# Patient Record
Sex: MALE
[De-identification: names, ages, dates, MRNs, and addresses within clinical notes are randomized per-mention and may not be internally consistent; named-entity substitution may affect disease eponyms.]

---

## 2017-04-05 ENCOUNTER — MEDICATION RENEWAL (OUTPATIENT)
Age: 62
End: 2017-04-05

## 2017-05-23 ENCOUNTER — APPOINTMENT (OUTPATIENT)
Dept: ENDOCRINOLOGY | Facility: CLINIC | Age: 62
End: 2017-05-23

## 2017-05-23 VITALS
BODY MASS INDEX: 33.91 KG/M2 | DIASTOLIC BLOOD PRESSURE: 63 MMHG | SYSTOLIC BLOOD PRESSURE: 99 MMHG | WEIGHT: 250 LBS | HEART RATE: 62 BPM

## 2017-06-08 ENCOUNTER — MEDICATION RENEWAL (OUTPATIENT)
Age: 62
End: 2017-06-08

## 2017-10-31 ENCOUNTER — RX RENEWAL (OUTPATIENT)
Age: 62
End: 2017-10-31

## 2017-12-18 ENCOUNTER — RX RENEWAL (OUTPATIENT)
Age: 62
End: 2017-12-18

## 2018-05-07 ENCOUNTER — APPOINTMENT (OUTPATIENT)
Dept: ENDOCRINOLOGY | Facility: CLINIC | Age: 63
End: 2018-05-07
Payer: COMMERCIAL

## 2018-05-07 VITALS
HEIGHT: 72 IN | HEART RATE: 68 BPM | SYSTOLIC BLOOD PRESSURE: 135 MMHG | WEIGHT: 237 LBS | DIASTOLIC BLOOD PRESSURE: 71 MMHG | BODY MASS INDEX: 32.1 KG/M2

## 2018-05-07 PROCEDURE — 99214 OFFICE O/P EST MOD 30 MIN: CPT

## 2018-11-01 ENCOUNTER — MEDICATION RENEWAL (OUTPATIENT)
Age: 63
End: 2018-11-01

## 2018-11-05 ENCOUNTER — MEDICATION RENEWAL (OUTPATIENT)
Age: 63
End: 2018-11-05

## 2019-01-02 ENCOUNTER — MEDICATION RENEWAL (OUTPATIENT)
Age: 64
End: 2019-01-02

## 2019-07-23 ENCOUNTER — APPOINTMENT (OUTPATIENT)
Dept: ENDOCRINOLOGY | Facility: CLINIC | Age: 64
End: 2019-07-23
Payer: COMMERCIAL

## 2019-07-23 VITALS
HEIGHT: 72 IN | DIASTOLIC BLOOD PRESSURE: 66 MMHG | WEIGHT: 229 LBS | SYSTOLIC BLOOD PRESSURE: 118 MMHG | BODY MASS INDEX: 31.02 KG/M2 | HEART RATE: 78 BPM

## 2019-07-23 LAB — HBA1C MFR BLD HPLC: 7.8

## 2019-07-23 PROCEDURE — 83036 HEMOGLOBIN GLYCOSYLATED A1C: CPT | Mod: QW

## 2019-07-23 PROCEDURE — 99214 OFFICE O/P EST MOD 30 MIN: CPT | Mod: 25

## 2019-07-23 NOTE — PHYSICAL EXAM
[Alert] : alert [Healthy Appearance] : healthy appearance [Normal Voice/Communication] : normal voice communication [No Proptosis] : no proptosis [No Lid Lag] : no lid lag [Normal Hearing] : hearing was normal [Thyroid Not Enlarged] : the thyroid was not enlarged [No Thyroid Nodules] : there were no palpable thyroid nodules [Clear to Auscultation] : lungs were clear to auscultation bilaterally [Normal S1, S2] : normal S1 and S2 [Regular Rhythm] : with a regular rhythm [Pedal Pulses Normal] : the pedal pulses are present [No Edema] : there was no peripheral edema [No Stigmata of Cushings Syndrome] : no stigmata of cushings syndrome [Foot Ulcers] : no foot ulcers [Normal Sensation on Monofilament Testing] : normal sensation on monofilament testing of lower extremities [Normal Affect] : the affect was normal [Normal Mood] : the mood was normal [de-identified] : fingerstick, 145.  5hpp (1egg, 2 pancakes (no syrup), sausage) [de-identified] : dystrophic nails 1st two toes on L, mild acanthosis nigricans

## 2019-07-23 NOTE — ASSESSMENT
[FreeTextEntry1] : Diabetes, no known complications, suboptimal. goal A1c < 7.0%.    A1c may be higher due to increased carb intake, even though portions may be decreased.\par continue current meds.  will reduce metformin dose if A1c < 6.5%  next visit. (can reduce to 850mg bid)\par reduce lisinopril dose, since having low BP.  Advised to drink more water and not get dehydrated\par ophtho recommended.\par RTO 1 year

## 2019-07-23 NOTE — DATA REVIEWED
[FreeTextEntry1] : 7/19: A1c 7.8% (point of care)\par 9/18: A1c 6.8$, TSh 1.33, Cr 0.7\par 3/18: A1c 7.2%, tot chol 145, trig 163, HDL 49, LDL 63, B12 458, urine microalb < 12\par 9/17: A1c 6.4%, B12 228, tot chol 134, trig 132, HDL 49, LDL 59, TSH 1.12, urine microalb < 12\par 7/16: A1c 6.4%, tot chol 158, trig 231, HDL 39, LDL 73, urine microalb < 1.2, TSH 1.710, 25-D 40.5\par 1/16: A1c 7.0%, tot chol 147, trig 130, HDL 48, LDL 73, urine microalb < 1.2, Cr 0.69

## 2019-07-23 NOTE — HISTORY OF PRESENT ILLNESS
[FreeTextEntry1] : feeling good.\par getting dental implants, wearing dentures for 6 months.  appetite decreased, so lost more weight\par now eating for nutrition, not really enjoying eating\par no chest pain or SOB. no neuropathy symptoms\par due for opthho, will see [facility]\par tolerating Trulicity, says it suppresses appetite more than Victoza\par still very active, playing tennis and pickle ball\par having dizziness due to low BP recently. doesn't take lisinopril if BP < 100/66, so didn't have to take dose twice\par \par Meds:\par Trulicity 1.5mg/week, metformin 1g bid\par lisinopril 20mg\par simvastatin 40mg, aspirin 81mg\par \par

## 2019-10-11 ENCOUNTER — RESULT REVIEW (OUTPATIENT)
Age: 64
End: 2019-10-11

## 2019-10-30 ENCOUNTER — MEDICATION RENEWAL (OUTPATIENT)
Age: 64
End: 2019-10-30

## 2019-10-30 DIAGNOSIS — I10 ESSENTIAL (PRIMARY) HYPERTENSION: ICD-10-CM

## 2019-10-30 RX ORDER — DULAGLUTIDE 1.5 MG/.5ML
1.5 INJECTION, SOLUTION SUBCUTANEOUS
Qty: 12 | Refills: 3 | Status: ACTIVE | COMMUNITY
Start: 2018-11-01 | End: 1900-01-01

## 2019-10-30 RX ORDER — ATORVASTATIN CALCIUM 20 MG/1
20 TABLET, FILM COATED ORAL DAILY
Qty: 30 | Refills: 5 | Status: ACTIVE | COMMUNITY
Start: 2019-10-30

## 2019-10-30 RX ORDER — AMLODIPINE BESYLATE 2.5 MG/1
2.5 TABLET ORAL DAILY
Qty: 90 | Refills: 3 | Status: ACTIVE | COMMUNITY
Start: 2019-10-30